# Patient Record
(demographics unavailable — no encounter records)

---

## 2024-12-17 NOTE — HEALTH RISK ASSESSMENT
[Good] : ~his/her~  mood as  good [Yes] : Yes [Monthly or less (1 pt)] : Monthly or less (1 point) [1 or 2 (0 pts)] : 1 or 2 (0 points) [Never (0 pts)] : Never (0 points) [No] : In the past 12 months have you used drugs other than those required for medical reasons? No [No falls in past year] : Patient reported no falls in the past year [0] : 2) Feeling down, depressed, or hopeless: Not at all (0) [Patient reported mammogram was normal] : Patient reported mammogram was normal [Patient reported PAP Smear was normal] : Patient reported PAP Smear was normal [With Family] : lives with family [] :  [Reports changes in vision] : Reports changes in vision [Never] : Never [HIV test declined] : HIV test declined [Hepatitis C test declined] : Hepatitis C test declined [Reports normal functional visual acuity (ie: able to read med bottle)] : Reports normal functional visual acuity

## 2025-02-14 NOTE — HISTORY OF PRESENT ILLNESS
[FreeTextEntry1] : Irena comes in today to establish care for management of her weight gain. She has a BMI of 27 and her last check up showed prediabetes and hyperlipidemia, which is concerning to her. She has been in perimenopause for 2 years. Her LMP was 9/2024. She said since the time of COVID/perimenopause she has been gaining weight, and no matter what she does -- cuts calories, exercises more -- she cannot get it off like she used to when she was younger. She is very uncomfortable at her current weight. She is unhappy with herself and how she looks. She doesn't like how she feels. Her energy levels are lower. She has less stamina. 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 She also experiences poor sleep due to menopause with terminal insomnia.   She has a FIT workout carl with an AI instructor she likes. She also has lululemon/peloton mirror, though she may get rid of that carl. She has a treadmill and a pilates machine. Currently she is getting about 15K steps a day and inconsistently doing the FIT workout carl. That carl is fully customizable.  Nutrition 24 hour recall: Coffee with creamer (the full fat creamer is a great robert to her and this is untouchable) 11 AM Pineapple slices Lunch Cous Cous with olives and power greens Dinner -- homemade mac and cheese Mocktail Banana chips chocolate  She said she generally eats quite healthy -- is a pescatarian. Probably eats too much portion size -- is always hungry. Admits to too much cheese.

## 2025-02-14 NOTE — ASSESSMENT
[FreeTextEntry1] : BMI of 27 with 2 weight related comorbidities: hyperlipidemia ( and ) and prediabetes (A1c 5.9).   Patient has tried all she can to use conventional lifestyle changes including programs like [employer] and has not been able to lose a medically meaningful amount of weight and sustain the loss. She is interested in reinitiating lifestyle changes again, but she would like to consider weight loss medication. We settled on Wegovy 0.25 mg and she will see me just before or just after the fourth injection.     Discussed risks, benefits, and side effects of drugs including, but not limited to: Nausea, vomiting, diarrhea, vomiting, constipation. Discussed incidence of thyroid cancer seen in rats, which have GLP-1 receptors on their thyroids; whereas, humans do not. Patient denies family history or personal history of thyroid cancer. We do not prescribe with personal history of medullary thyroid ca or MEN2 in the family. Discussed pancreatitis. At this time no causation definitively noted, and it appears incidence in GLP-1 taking patients not greater than baseline, though some case of pancreatitis have been reported as in general population. Patient denies history of pancreatitis; would avoid in patients with personal history of pancreatitis. Reports of gallbladder disease, which is common in patients who lose significant weight. Hypersensitivity reactions can occur in 2-5% of patients. Dizziness and fatigue occur in 2-7% of patients.   Discussed need for regular exercise, specifically weight bearing exercise, to mitigate muscle loss.   Discussed need for healthy nutrition. Caloric intake will decrease because of decreased portion sizes, so proper nutrition is all the more important to avoid malnutrition in the face of decreased appetite.   Discussed proper administration of drug. If patient has questions once drug is picked up from pharmacy, patient is welcome to schedule brief time to see me during my lunch hour so that I can go over administration in person. Patient also informed there are online videos showing proper administration. Discussed tracking location of injection with date and then jotting down any perceived side effects. Discussed titration schedule of drug. Explained some patients require titration up 1-2 doses before seeing any effect. Others have notable decreased appetite on the first dose. Reviewed these drugs are simply a tool to aid in weight loss. Healthy nutrition, portion control, exercise, adequate sleep, stress reduction, avoidance/moderation of risky substances, etc. are all still lewis to achieve optimal health.  In addition, she agreed to the following action plan: Physical Activity:  In addition to your walking (15,000 steps per day)  Saturdays and Wednesdays Saturdays at 9 AM  Wednesdays at 5:45 AM  One day 20 minutes upper body and core  The other day 20 minutes lower body and core   Nutrition:  Keep cheese and dairy (other than your creamer which you get no matter what) to four days a week max (no additional dairy 3 days per week.   We discussed the basics of nutrition and physical activity as stated in the handout below:  Nutrition Basics   To lose weight, you must be in a calorie deficit. There is no diet that negates the laws of thermodynamics. The best dietary pattern to follow for weight loss is the one you will stick to (provided some basic nutritional principles are met; see below).   Macronutrients: Protein, Lipids (fat), Carbohydrates ALL macronutrients are important!     Protein: Focus on lean proteins. With rare exception, most foods contain more than 1 macronutrient. One issue with protein, particularly from animal sources, is that there is usually fat associated with it, and some fats are healthier than others. High quality protein is low in fat, particularly saturated fat (see below). Lean proteins include: fish, skinless poultry, beans, tofu, seitan, low fat or fat free dairy Recommended protein intake is 0.8 g/kg body weight. Some categories of people (the elderly, pregnant women, and some say those on weight loss drugs or attempting large amounts of weight loss) should be more in the 1-1.2 g/kg body weight range. 1 gram of protein has approximately 4 calories. After strength training, it is best to ingest a protein containing meal ideally within 30 minutes, but definitely within 2 hours of training.   Lipids (fat): Focus on unsaturated fat rather and limit saturated fat Saturated fats primarily (but not exclusively) come from animal sources. Common sources of saturated fat: beef, pork, butter, cream, cheese, coconut oil, palm oil Many foods have a combination of saturated and unsaturated fats (nuts/seeds are a good example), but if they contain primarily unsaturated fats, we put them in the healthier category. Better to cook with olive oil or avocado oil than butter or coconut oil for example. When you see a low fat or fat free dairy product, the unhealthy saturated fat is what has been reduced. Use caution (especially with yogurt and salad dressing) to make sure the company did not add sugar to make up for lowering the fat. 1 gram of fat has approximately 9 calories. Be careful about portion sizes of even the healthy fats. The calories can add up.     Carbohydrates: Carbs are not evil! The main thing to focus on with carbohydrates is whether or not they are whole or refined. You may also have heard the terms simple and complex. When humans alter a food from its natural state, it is not usually to make it healthier. Your body processes a strawberry very differently than a strawberry fruit rollup. Fruit in its whole state is healthy. Yes, there are natural sugars in there, but they are attached to fiber and are digested differently than if the fiber is removed (juice). Potatoes are a complex carbohydrate. The reason they get a bad rap is because of how they are often prepared: drenched in butter or cheese, fried, ultraprocessed (chips). When we're talking about foods that contain flour it's important to read the ingredients label. You want the flour to say whole wheat or whole grain. You do not want to see the words refined or enriched. Refined means they removed all the nutritious parts of the grain. Enriched means they put back a few vitamins or other things. Whole grains are incredibly healthy. Even though we consider them a carbohydrate, many have a decent amount of protein. They are also rich in fiber. Examples of whole grains include: brown rice, quinoa, oats, barley, farro, amaranth It's important to read labels and specifically focus on added sugar. For example, milk has sugars listed on the label; lactose is a sugar, so of course it does. If you look at added sugars, however, that should say zero (and will if its not chocolate or other flavored milk). 1 gram of carbohydrate has approximately 4 calories.    Fiber: Comes from plants Most Americans are fiber deficient. Women should get 25-28 grams per day depending on which guideline you use. Men should get 35 grams per day. Low fiber diets are associated with increased heart disease, various cancers including colon, increased dementia, increased diabetes  the list is long. Fiber also fills up your stomach for fewer calories because most foods high in fiber are less calorie dense. Insoluble fiber is not digested and bulks up your stool. Soluble fiber is digested and helps lower LDL cholesterol and blood glucose levels.     Sodium: <2000 mg daily is a low sodium diet <1500 mg daily is a strictly low sodium diet You definitely should be <2500 mg daily if you have no health problems requiring you to restrict sodium. Sodium hides in a lot of packaged foods, sometimes in things that dont taste salty, so its important to read the label. Restaurant food is often high in sodium because  are taught to season liberally. Lowering sodium while simultaneously increasing foods that are high in potassium and magnesium works synergistically to lower blood pressure.  A Note About Physical Activity Physical Activity incorporates both Exercise and Non-exercise activity Exercise is formal and repetitive with the goal of increasing heart rate or strengthening muscle. Non-exercise activity includes daily movement/step count. BOTH are important. Guidelines for exercise are either 150 minutes of moderate cardio (walking at a pace where you can speak but not sing)  OR 75 minutes of vigorous cardio (out of breath  running, rope jumping, HIIT) Per week PLUS 2 days per week of strength involving upper and lower body as well as core. Stretching and balance is also recommended (yoga is a great option) The biggest benefit in health from exercise is seen in people going from doing nothing to doing something. SOMEthing  ANYthing  is better than nothing at all. If you only have 10 minutes one day, that 10 minutes is worth it.  Total time spent reviewing records, seeing patient, sending prescription, generating action plan, documenting visit 85 minutes.

## 2025-02-14 NOTE — SOCIAL HISTORY
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

## 2025-03-11 NOTE — HISTORY OF PRESENT ILLNESS
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  Patient has chronic insomnia for many years . Her insomnia has gotten worse for the last 2 yrs after menopause.   She is feeling tired and exhausted during the day.    Patient has minimal symptoms suggestive of sleep apnea.  Patient has very loud snoring, no witnessed apneas.  If she gets 6 to 7 hours of sleep she is not sleepy during the daytime.  She is not sleepy while driving.  Stop bang score is 3 which is intermediate risk for sleep apnea.  Patient is trying to exercise during the day.  She does not drink excessive caffeinated products, she drinks about 2 cups of coffee.  She eats dinner 5-6 at night and she is not on electronics closer to the bedtime.    Patient drinks one glass of wine 3 days a week.  She goes to bed 10 pm able to fall asleep easily, but cant stay asleep. Wakes up at 1 am she is up for 1-3 hrs before falling back to sleep. Patients mind starts racing at 1 am with all the work thoughts and problems.  She is tired when she wakes up.

## 2025-03-11 NOTE — ASSESSMENT
[FreeTextEntry1] : Assessment:  Chronic Insomnia (Sleep Maintenance Type): Long-standing insomnia worsened over the past 2 years post-menopause, characterized by easy sleep onset but prolonged awakenings (1-3 hours) starting at 1 AM with racing thoughts about work. Daytime tiredness and exhaustion result. Likely exacerbated by menopausal hormonal changes and stress/anxiety (racing mind). Possible Obstructive Sleep Apnea (SHAD): Very loud snoring and STOP-BANG score of 3 suggest intermediate risk, though minimal symptoms (no witnessed apneas, no sleepiness with adequate sleep) reduce suspicion. Further evaluation may be warranted if insomnia treatment alone is insufficient.  Plan:  Insomnia Management: Initiate cognitive behavioral therapy for insomnia (CBT-I) as first-line treatment, focusing on stimulus control (e.g., leaving bed if awake >20 minutes), sleep restriction (limit time in bed to actual sleep time, ~6-7 hours), and cognitive strategies to address racing thoughts (e.g., journaling work concerns before bed). Refer to a CBT-I specialist if available. Consider short-term pharmacotherapy if CBT-I alone is insufficient (e.g., low-dose trazodone or a non-benzodiazepine hypnotic like zolpidem), but defer pending trial of behavioral therapy given good sleep hygiene baseline. Can try cbn product in the meanwhile to see if that helps.  ordered psg to evaluate sleep stages and to rule out apnea. Lifestyle Modifications: Maintain current sleep hygiene: consistent 10 PM bedtime, limited caffeine (2 cups), early dinner (5-6 PM), and no electronics near bedtime. Reduce alcohol to <3 days/week if possible, as even 1 glass may fragment sleep, especially in post-menopausal women.

## 2025-03-11 NOTE — PHYSICAL EXAM
[General Appearance - Well Developed] : well developed [General Appearance - Well Nourished] : well nourished [II] : II [Heart Sounds] : normal S1 and S2 [Murmurs] : no murmurs [] : no respiratory distress [Auscultation Breath Sounds / Voice Sounds] : lungs were clear to auscultation bilaterally [No Focal Deficits] : no focal deficits [Oriented To Time, Place, And Person] : oriented to person, place, and time [Memory Recent] : recent memory was not impaired

## 2025-03-24 NOTE — HISTORY OF PRESENT ILLNESS
[FreeTextEntry1] : 52 year old female presents/consents for telehealth. She is in her home in [place], [place]. I am in my office in [place], [place].  Irena is following up initiation of Wegovy 0.25 mg. She is tolerating it very well. She had a little heartburn and a bit of gas, but nothing severe. She is very pleased with the effects. She notices appetite suppression, but more importantly, the food noise is gone. She said it is like a miracle.  She got sick after she saw me and only has been doing her strength twice a week alongside her walking for 1 week.  She did a good job cutting down on the cheese/dairy to 4 days a week maximum.

## 2025-03-24 NOTE — ASSESSMENT
[FreeTextEntry1] : Overweight/BMI 25: Patient doing very well on Wegovy and ready to go up to the 0.5 mg dose. The appetite suppression effects are starting to wear off around Thursday, so hopefully the higher dose will hold her until further on in the week. Follow up in 4 weeks.  Because she only started her physical activity goal this past week, she will keep that same goal for another month. In addition to her regular walking, she will take Saturdays and Wednesdays to do 20 minutes of upper body and 10 minutes of core or 20 minutes of lower body and 10 minutes of core.  She will continue to keep the dairy only to 4 days a week, watching portion sizes. She will make an effort to get another 1-2 servings of fruits and vegetables in per day.

## 2025-04-24 NOTE — HISTORY OF PRESENT ILLNESS
[FreeTextEntry1] : 52 year old female presents/consents for telehealth with video and audio input. She is in her home in [place], [place]. I am in my home office in [place], [place].  Elena is here for follow up of weight management. She is on the 0.5 mg dose of Wegovy. She has been tolerating it well without any complaints. She said the only issue is that if she eats too fast and ends up oveextending her stomach, she is uncomfortable. She is ready to go up to the 1 mg dose.  She has been doing her walking regularly (7-10K steps per walk), but she added in strength and stretching TIW on her ifit carl. She is exercising upper body, lower body, and core.  She is eating a lot less dairy. We initially strove to get it down to 4 days a week or less. She is now less than that. She eats little cheese, once in a while a little cottage cheese, milk in coffee which is not a significant serving size. She is eating at least 1-2 fruits per day. She is getting at least 2-3 servings of vegetables in per day.

## 2025-04-24 NOTE — ASSESSMENT
[FreeTextEntry1] : Former chronic overweight with hyperlipidemia and prediabetes: Patient now with BMI in normal range, though still looking to lose a little more weight to be at her comfortable weight. She will increase to 1 mg weekly and I will follow up with her in 1 month. She may stay there for maintenance depending on how she responds. She will continue on the low saturated fat, high fiber, low sugar diet. She had some questions about protein. We talked about a range of 0.8-1.2g/kg body weight as a good goal. We talked about ways to get protein in in a lean fashion with small serving size. Things like a hard boiled egg, a tablespoon of nut butter, nuts, seeds, chick peas are good options. She will continue to restrict excess dairy.   Prediabetes: Check a1c/CMP next visit. Hyperlipidemia: Check lipid panel next visit.  Follow up 1 month.

## 2025-05-21 NOTE — HISTORY OF PRESENT ILLNESS
[FreeTextEntry1] : Patient presents/consents to telehealth with video and audio input.  She is in her home in [place], [place].  I am in my office in [place], [place].  Ms. Gamble presents for follow-up of weight management.  She is on Wegovy 0.5 mg.  She said she is tolerating it very well, and she is very happy with her progress.  She feels an appropriate amount of appetite suppression, but is still remembering to eat.  She finds it is easier to make healthier choices.  He does feel that the appetite suppressant effects to wear off towards the end of the week and she would like to start the next dose, but she agrees with me that that probably will be the highest that she needs to go.  She would like to lose another 5 or 10 pounds and then work on maintenance.  She is sleeping better.  She is not using 1 before bed.  She does not need her magnesium supplement.    She is doing strength training twice a week and walking 3 times a week.  This is a consistent habit now.  24-hour recall: Breakfast (late): Coffee with creamer, a peanut butter sandwich Lunch/dinner: Frittata packed with different vegetables, eggs Beverage: Water  She is working on making sure she has 1-2 servings of fruit most days, 2-3 servings of vegetables, trying to make her grains whole grains, limiting saturated fat and eating lean protein overall.

## 2025-05-21 NOTE — ASSESSMENT
[FreeTextEntry1] : Former chronic overweight with hyperlipidemia and prediabetes: Patient has lost 13.58% of her body weight so far.  She would like to lose a little bit more, but then she would like to focus on maintenance techniques.  We will increase her to 1 mg and she will see me between the 3rd and 4th dose.  I have ordered a comprehensive metabolic profile, lipid panel and hemoglobin A1c to assess patient's status on her hyperlipidemia and prediabetes.  She will do those fasting at some point within the next few weeks and I will notify her of those results when I have them.  Reinforced her current exercise regimen.  She is close to exercise guidelines for the average [place] adult. Patient is also following a healthy Mediterranean dietary pattern, which I also reinforced.